# Patient Record
Sex: FEMALE | Race: WHITE
[De-identification: names, ages, dates, MRNs, and addresses within clinical notes are randomized per-mention and may not be internally consistent; named-entity substitution may affect disease eponyms.]

---

## 2018-04-22 ENCOUNTER — HOSPITAL ENCOUNTER (EMERGENCY)
Dept: HOSPITAL 68 - ERH | Age: 53
End: 2018-04-22
Payer: COMMERCIAL

## 2018-04-22 VITALS — SYSTOLIC BLOOD PRESSURE: 166 MMHG | DIASTOLIC BLOOD PRESSURE: 88 MMHG

## 2018-04-22 DIAGNOSIS — J18.9: Primary | ICD-10-CM

## 2018-04-22 NOTE — RADIOLOGY REPORT
EXAMINATION:
XR CHEST
 
CLINICAL INFORMATION:
Cough and low-grade temperature
 
COMPARISON:
January 12, 2016
 
TECHNIQUE:
2 views of the chest were obtained.
 
FINDINGS:
There is parenchymal disease seen within the left lower lobe and lingula.
There may be some degree of pleural fluid present. Heart normal size. No
evidence of airspace edema. There are some increased interstitial markings
and vascular markings bilaterally. No pneumothorax is seen.
 
IMPRESSION:
Left lower lobe and lingula disease consistent with atelectasis or pneumonia
with some degree of pleural fluid suspected.

## 2018-04-22 NOTE — ED INFLUENZA/URI COMPLAINT
History of Present Illness
 
General
Chief Complaint: General Adult
Stated Complaint: "COUGH W/ PHLEM"
Source: family, old records
Exam Limitations: NONVERBAL SECONDARY TO AUTISM
 
Vital Signs & Intake/Output
Vital Signs & Intake/Output
 Vital Signs
 
 
Date Time Temp Pulse Resp B/P B/P Pulse O2 O2 Flow FiO2
 
     Mean Ox Delivery Rate 
 
 0801 98.4 90 20 193/93  95 Room Air  
 
 0644 99.8 101 22 150/93  95 Room Air  
 
 
 
Allergies
Coded Allergies:
MDX - Bacitracin (From Band-Aid Brand Adhesive Bandages) (Severe, RASH 01/15/14)
MDX - Latex (LATEX) (Severe, LIP SWELLING 11)
MDX - Neomycin (From Band-Aid Brand Adhesive Bandages) (Severe, RASH 01/15/14)
MDX - Polymyxin B (From Band-Aid Brand Adhesive Bandages) (Severe, RASH 01/15/14
)
MDX - TAPE (TAPE) (Severe, RASH FROM ADHESIVE TAPE 01/15/14)
MDX - Shrimp (Shrimp) (Intermediate, FACIAL EDEMA 01/15/14)
Uncoded Allergies:
LOBSTER (Severe, FACIAL EDEMA 11)
GRASS (Intermediate, URI 11)
TREES (Intermediate, URI 11)
 
Reconcile Medications
Amoxicillin (Amoxil 875 MG Tablets) 875 MG TAB   1 TAB PO BID bronchospasm
Atorvastatin Calcium (Lipitor) 40 MG TAB   1 TAB PO DAILY CHOLESTROL  (Reported)
Benzonatate (Tessalon Perle) 100 MG SGL   1 CAP PO TID PRN COUGH
Budesonide/Formoterol Fumara (Symbicort 160-4.5 Mcg Inhaler) 160 MCG/4.5 MCG PUF
  2 PUFF INH Q12P PRN BREATHING   (Reported)
Fluoxetine Hydrochloride (Fluoxetine) 20 MG CAP   1 TAB PO DAILY ALLERGIES   (
Reported)
Hydrochlorothiazide (Hydrodiuril 25 MG Tab) 25 MG TAB   1 TAB PO DAILY BLOOD 
PRESSURE   (Reported)
Mometasone Furoate (Nasonex) 0.05 MG/Actuation SPR   2 SPRAY INH PRN PRN 
BREATHING   (Reported)
Prednisone 20 MG TAB   1 TAB PO BID BRONCHOSPASM
SALMON OIL/OMEGA-3 FATTY ACIDS (Fish Oil 500 MG Softgel) 500 MG CAP   1 TAB PO 
DAILY VITAMIN  (Reported)
 
Triage Note:
PER FAMILY OF PT(PT NONVERBAL)
 COUGH WITH THICK PHLEGM X 2 WEEKS NO KNOWN FEVERS
Triage Nurses Notes Reviewed? yes
HPI:
Patient brought in by her mother for evaluation of a productive cough that she's
had for the past few weeks.  There are no fevers.  There is no vomiting.  
Patient has been acting appropriately per her mom.  Patient is unable to give 
any history secondary to nonverbal secondary to autism since birth.  Similar 
symptoms in the past the patient has required antibiotics and steroids.
 
Past History
 
Travel History
Traveled to [place] past 21 day No
 
Medical History
Any Pertinent Medical History? see below for history
Neurological: NONE
EENT: NONE
Cardiovascular: hypertension
Respiratory: asthma
Gastrointestinal: NONE
Hepatic: NONE
Renal: NONE
Musculoskeletal: NONE
Psychiatric: AUTISM
Endocrine: NONE
 
Surgical History
Surgical History: non-contributory
 
Psychosocial History
What is your primary language English
Tobacco Use: Never used
ETOH Use: denies use
Illicit Drug Use: denies illicit drug use
 
Family History
Hx Contributory? No
 
Review of Systems
 
Review of Systems
Constitutional:
Reports: no symptoms. 
EENTM:
Reports: no symptoms. 
Respiratory:
Reports: see HPI, cough, sputum production. 
GI:
Reports: no symptoms. 
Neurological/Psychological:
Reports: no symptoms. 
Immunologic/Allergic:
Reports: no symptoms. 
 
Physical Exam
 
Physical Exam
General Appearance: well developed/nourished, awake, mild distress
Head: atraumatic, normal appearance
Eyes:
Bilateral: PERRL, EOMI. 
Ears, Nose, Throat: normal ENT inspection, moist mucous membrane
Neck: normal inspection, supple, full range of motion
Respiratory: normal breath sounds, rhonchi (SCATTERED)
Cardiovascular: regular rate/rhythm, normal peripheral pulses
Extremities: normal inspection, normal capillary refill, normal range of motion,
no edema
Lymphatic: no anterior cervical chrissie
 
Core Measures
Sepsis Present: No
Sepsis Focused Exam Completed? No
 
Progress
Differential Diagnosis: pneumonia, BRONCHITIS
Plan of Care:
 Current Medications
 
 
  Sig/José Antonio Start time  Last
 
Medication Dose  Stop Time Status Admin
 
Amoxicillin 500 MG ONCE  UNVr 
 
(Amoxil)   816  
 
Benzonatate 100 MG ONCE  UNVr 
 
(Tessalon Capsule)   816  
 
Prednisone 60 MG ONCE  UNVr 
 
   816  
 
 
 
Diagnostic Imaging:
Viewed by Me: Radiology Read.  Discussed w/RAD: Radiology Read. 
CXR Impression: PATIENT: SHARIF BARTON  MEDICAL RECORD NO: [id number] PRESENT 
AGE: 53  PATIENT ACCOUNT NO: [id number] : 65  LOCATION: [facility] ORDERING 
PHYSICIAN: Martin De La Cruz MD     SERVICE DATE:  EXAM TYPE: RAD - XRY-
CHEST XRAY, TWO VIEWS EXAMINATION: XR CHEST CLINICAL INFORMATION: Cough and low-
grade temperature COMPARISON: 2016 TECHNIQUE: 2 views of the chest 
were obtained. FINDINGS: There is parenchymal disease seen within the left lower
lobe and lingula. There may be some degree of pleural fluid present. Heart 
normal size. No evidence of airspace edema. There are some increased 
interstitial markings and vascular markings bilaterally. No pneumothorax is 
seen. IMPRESSION: Left lower lobe and lingula disease consistent with 
atelectasis or pneumonia with some degree of pleural fluid suspected. DICTATED 
BY: Mahendra Bejarano MD  DATE/TIME DICTATED:18 :
LUIZ  DATE/TIME TRANSCRIBED:18 CONFIDENTIAL, DO NOT COPY 
WITHOUT APPROPRIATE AUTHORIZATION.  <Electronically signed in Other Vendor 
System>                                                                         
              SIGNED BY: Mahendra Bejarano MD 18 08
Initial ED EKG: none
Comments:
[facility] CALLED ASKING ABOUT THE DELAY IN THE CXR READ.  THEY STATE 
THAT HER CXR IS THE NEXT TO BE READ.
 
Departure
 
Departure
Disposition: HOME OR SELF CARE
Condition: Stable
Clinical Impression
Primary Impression: Pneumonia
Qualifiers:  Pneumonia type: due to unspecified organism Laterality: left Lung 
location: lower lobe of lung Qualified Code: J18.1 - Lobar pneumonia, 
unspecified organism
Referrals:
Deisy Cannon (PCP/Family)
 
Additional Instructions:
Take the amoxicillin, prednisone as prescribed.  Take Tessalon as needed for the
cough.  Patient retake her blood pressure medication when he got home.  Return 
if symptoms worsen or for any concerns.
Departure Forms:
Customer Survey
General Discharge Information
Prescriptions:
Current Visit Scripts
Amoxicillin 1 TAB PO TID  
     #30 TAB 
 
Benzonatate (Tessalon Perle) 1 CAP PO TID PRN COUGH 
     #30 CAP 
 
Prednisone 1 TAB PO DAILY  
     #18 TAB 
     TAKE 3 TABS FOR 3 DAYS THEN TAKE 2 TABS
     FOR 3 DAYS THEN TAKE 1 TAB FOR 3 DAYS

## 2018-09-24 ENCOUNTER — HOSPITAL ENCOUNTER (EMERGENCY)
Dept: HOSPITAL 68 - ERH | Age: 53
End: 2018-09-24
Payer: COMMERCIAL

## 2018-09-24 VITALS — DIASTOLIC BLOOD PRESSURE: 93 MMHG | SYSTOLIC BLOOD PRESSURE: 150 MMHG

## 2018-09-24 DIAGNOSIS — R05: Primary | ICD-10-CM

## 2018-09-24 DIAGNOSIS — I10: ICD-10-CM

## 2018-09-24 DIAGNOSIS — J45.909: ICD-10-CM

## 2018-09-24 NOTE — ED DYSPNEA/ASTHMA COMPLAINT
History of Present Illness
 
General
Chief Complaint: Upper Respiratory Sx/Fever
Stated Complaint: HEAVY COUGH PER MOM
Source: patient
Exam Limitations: no limitations
 
Vital Signs & Intake/Output
Vital Signs & Intake/Output
 Vital Signs
 
 
Date Time Temp Pulse Resp B/P B/P Pulse O2 O2 Flow FiO2
 
     Mean Ox Delivery Rate 
 
717 98.9 94 15 146/73  95 Room Air  
 
 
 
Allergies
Coded Allergies:
adhesive tape (Severe, RASH 18)
bacitracin (Severe, RASH 18)
latex (Severe, LIP SWELLING 18)
neomycin (Severe, RASH 18)
polymyxin B (Severe, RASH 18)
shrimp (Intermediate, RACIAL EDEMA 18)
Uncoded Allergies:
LOBSTER (Severe, FACIAL EDEMA 11)
GRASS (Intermediate, URI 11)
TREES (Intermediate, URI 11)
 
Reconcile Medications
Atorvastatin Calcium (Lipitor) 40 MG TAB   1 TAB PO DAILY CHOLESTROL  (Reported)
Fluoxetine Hydrochloride (Fluoxetine) 20 MG CAP   1 TAB PO DAILY ALLERGIES   (
Reported)
Hydrochlorothiazide (Hydrodiuril 25 MG Tab) 25 MG TAB   1 TAB PO DAILY BLOOD 
PRESSURE   (Reported)
SALMON OIL/OMEGA-3 FATTY ACIDS (Fish Oil 500 MG Softgel) 500 MG CAP   1 TAB PO 
DAILY VITAMIN  (Reported)
 
Triage Note:
TRIAGE: PATIENT TO ER FROM HOME W/ MOTHER
 REPORTING +PRODUCTIVE COUGH X 1 WEEK, UNKNOWN WHAT
 COLOR SPUTUM. ALSO REPORTING NASAL CONGESTION,
 DENIES SOB. NO ACUTE RESP DISTRESS. PATIENT HX MR,
 MOTHER W/ PATIENT FOR SUPPORT/ ASSIST.
Triage Nurses Notes Reviewed? yes
Duration: day(s):, continues in ED, intermittent
Timing: multiple episodes today
Severity: severe
HPI:
Patient presents for evaluation of a "chest cough" that began about 1 week ago. 
The patient herself is unable to provide history due to autism.  Patient's 
mother states there has been a phlegm production with the coughing but no 
associated dyspnea rashes vomiting or diarrhea.
 
Past History
 
Travel History
Traveled to [place] past 21 day No
 
Medical History
Any Pertinent Medical History? see below for history
Neurological: NONE
EENT: NONE
Cardiovascular: hypertension
Respiratory: asthma
Gastrointestinal: NONE
Hepatic: NONE
Renal: NONE
Musculoskeletal: NONE
Psychiatric: AUTISM
Endocrine: NONE
Blood Disorders: NONE
Cancer(s): NONE
GYN/Reproductive: NONE
 
Surgical History
Surgical History: non-contributory
 
Psychosocial History
What is your primary language English
Tobacco Use: Never used
 
Family History
Hx Contributory? No
 
Review of Systems
 
Review of Systems
Constitutional:
Reports: see HPI. 
 
Physical Exam
 
Physical Exam
Respiratory: see below
Comments:
Gen.: Well-nourished, well-developed, no acute respiratory distress.
Head: Normocephalic, atraumatic.
Eyes: Normal inspection bilaterally
Ears: Normal inspection bilaterally
Nose: Normal inspection
Throat/mouth : Moist mucosa 
Neck: Supple, full range of motion, no goiter
Heart: Regular rate and rhythm, no murmurs rubs or gallops
Lungs: Mild bibasilar crackles with good air entry bilaterally
Chest: Nontender
Back: Normal range of motion
Abdomen: Soft, nontender, nondistended, normal bowel sounds
Extremities: Normal range of motion grossly, equal radial pulses, no cyanosis, 
mild left pretibial edema (chronic per patient's mother due to prior trauma)
Neurologic: Cranial nerves grossly intact, unable to assess speech
Skin: warm and dry
Psychiatric: Calm, cooperative
 
 
Core Measures
ACS in differential dx? No
CVA/TIA Diagnosis No
Sepsis Present: No
Sepsis Focused Exam Completed? No
 
Progress
Differential Diagnosis: bronchitis, CHF, COPD, pneumonia, pneumothorax
Plan of Care:
See discharge instructions
CXR Impression: PATIENT: MARIELLA BARTON  MEDICAL RECORD NO: [id number] PRESENT 
AGE: 53  PATIENT ACCOUNT NO: [id number] : 65  LOCATION: [facility] ORDERING 
PHYSICIAN: Yann Sue MD     SERVICE DATE:  EXAM TYPE: 
RAD - XRY-CHEST XRAY, TWO VIEWS EXAMINATION: XR CHEST CLINICAL INFORMATION: 
Cough COMPARISON: 2018 TECHNIQUE: 2 views of the chest were obtained. 
FINDINGS: Lung volumes are symmetric. Small right pleural effusion is present 
with mild, somewhat streaky right basilar opacity. The left lung appears well-
aerated with probable small pleural effusion. No pneumothorax is seen. The 
cardiomediastinal contour is unremarkable. No acute osseous findings are seen. 
IMPRESSION: Small bilateral pleural effusions. Somewhat streaky right basilar 
opacity may reflect atelectasis or potentially developing consolidation. 
DICTATED BY: Patrick Melchor MD  DATE/TIME DICTATED:18 
:RAD.SHANE  DATE/TIME TRANSCRIBED:18 CONFIDENTIAL, 
DO NOT COPY WITHOUT APPROPRIATE AUTHORIZATION.  <Electronically signed in Other 
Vendor System>                                                                  
                     SIGNED BY: Patrick Melchor MD 18 0724
Initial ED EKG: none
Comments:
2018 9:38:05 AM I have updated Mariella and and her mother on the chest x-ray
report.  Given the indication of changes in the right chest I will prescribe an 
antibiotic for the possibility of an early pneumonia.  Her mother will then 
treat with an over-the-counter cough medication.
 
Departure
 
Departure
Disposition: HOME OR SELF CARE
Condition: Stable
Clinical Impression
Primary Impression: Cough
Referrals:
Deisy Cannon (PCP/Family)
 
Additional Instructions:
Azithromycin as prescribed.  Over-the-counter cough medication if needed.  
Follow-up with your primary care physician in 48-72 hours if not improving.  
Return if any concerns or sudden worsening.
 
Thank you for choosing the [facility] for your care.
It was a pleasure to serve you today.
 
Jaquan Bridges M.D.
[facility]
Departure Forms:
Customer Survey
General Discharge Information
 
Critical Care Note
 
Critical Care Note
Critical Care Time: non-applicable

## 2018-09-24 NOTE — RADIOLOGY REPORT
EXAMINATION:
XR CHEST
 
CLINICAL INFORMATION:
Cough
 
COMPARISON:
04/22/2018
 
TECHNIQUE:
2 views of the chest were obtained.
 
FINDINGS:
Lung volumes are symmetric. Small right pleural effusion is present with
mild, somewhat streaky right basilar opacity. The left lung appears
well-aerated with probable small pleural effusion. No pneumothorax is seen.
 
The cardiomediastinal contour is unremarkable. No acute osseous findings are
seen.
 
IMPRESSION:
Small bilateral pleural effusions. Somewhat streaky right basilar opacity may
reflect atelectasis or potentially developing consolidation.